# Patient Record
Sex: MALE | Race: WHITE | NOT HISPANIC OR LATINO | URBAN - METROPOLITAN AREA
[De-identification: names, ages, dates, MRNs, and addresses within clinical notes are randomized per-mention and may not be internally consistent; named-entity substitution may affect disease eponyms.]

---

## 2021-01-01 ENCOUNTER — OFFICE VISIT (OUTPATIENT)
Dept: URBAN - METROPOLITAN AREA CLINIC 68 | Facility: CLINIC | Age: 61
End: 2021-01-01

## 2021-02-02 ENCOUNTER — OFFICE VISIT (OUTPATIENT)
Dept: URBAN - METROPOLITAN AREA CLINIC 68 | Facility: CLINIC | Age: 61
End: 2021-02-02

## 2021-02-19 ENCOUNTER — OFFICE VISIT (OUTPATIENT)
Dept: URBAN - METROPOLITAN AREA SURGERY CENTER 12 | Facility: SURGERY CENTER | Age: 61
End: 2021-02-19

## 2021-02-22 ENCOUNTER — LAB OUTSIDE AN ENCOUNTER (OUTPATIENT)
Dept: URBAN - METROPOLITAN AREA CLINIC 68 | Facility: CLINIC | Age: 61
End: 2021-02-22

## 2021-02-22 LAB — 01: (no result)

## 2021-02-23 ENCOUNTER — TELEPHONE ENCOUNTER (OUTPATIENT)
Dept: URBAN - METROPOLITAN AREA CLINIC 68 | Facility: CLINIC | Age: 61
End: 2021-02-23

## 2021-03-30 ENCOUNTER — OFFICE VISIT (OUTPATIENT)
Dept: URBAN - METROPOLITAN AREA CLINIC 68 | Facility: CLINIC | Age: 61
End: 2021-03-30

## 2021-04-27 ENCOUNTER — OFFICE VISIT (OUTPATIENT)
Dept: URBAN - METROPOLITAN AREA CLINIC 68 | Facility: CLINIC | Age: 61
End: 2021-04-27

## 2021-10-27 ENCOUNTER — OFFICE VISIT (OUTPATIENT)
Dept: URBAN - METROPOLITAN AREA CLINIC 68 | Facility: CLINIC | Age: 61
End: 2021-10-27

## 2022-02-08 ENCOUNTER — OFFICE VISIT (OUTPATIENT)
Dept: URBAN - METROPOLITAN AREA CLINIC 68 | Facility: CLINIC | Age: 62
End: 2022-02-08

## 2022-03-18 ENCOUNTER — OFFICE VISIT (OUTPATIENT)
Dept: URBAN - METROPOLITAN AREA SURGERY CENTER 12 | Facility: SURGERY CENTER | Age: 62
End: 2022-03-18

## 2022-03-21 ENCOUNTER — LAB OUTSIDE AN ENCOUNTER (OUTPATIENT)
Dept: URBAN - METROPOLITAN AREA CLINIC 68 | Facility: CLINIC | Age: 62
End: 2022-03-21

## 2022-03-21 LAB — 01: (no result)

## 2022-03-24 ENCOUNTER — TELEPHONE ENCOUNTER (OUTPATIENT)
Dept: URBAN - METROPOLITAN AREA CLINIC 68 | Facility: CLINIC | Age: 62
End: 2022-03-24

## 2022-06-04 ENCOUNTER — TELEPHONE ENCOUNTER (OUTPATIENT)
Dept: URBAN - METROPOLITAN AREA CLINIC 68 | Facility: CLINIC | Age: 62
End: 2022-06-04

## 2022-06-04 RX ORDER — SODIUM SULFATE, POTASSIUM SULFATE, MAGNESIUM SULFATE 17.5; 3.13; 1.6 G/ML; G/ML; G/ML
SOLUTION, CONCENTRATE ORAL AS DIRECTED
Qty: 1 | Refills: 0 | OUTPATIENT
Start: 2021-02-02 | End: 2021-02-03

## 2022-06-05 ENCOUNTER — TELEPHONE ENCOUNTER (OUTPATIENT)
Dept: URBAN - METROPOLITAN AREA CLINIC 68 | Facility: CLINIC | Age: 62
End: 2022-06-05

## 2022-06-05 RX ORDER — MESALAMINE 1.2 G/1
TABLET, DELAYED RELEASE ORAL DAILY
Qty: 90 | Refills: 90 | Status: ACTIVE | COMMUNITY
Start: 2022-02-08

## 2022-06-25 ENCOUNTER — TELEPHONE ENCOUNTER (OUTPATIENT)
Age: 62
End: 2022-06-25

## 2022-06-25 RX ORDER — SODIUM SULFATE, POTASSIUM SULFATE, MAGNESIUM SULFATE 17.5; 3.13; 1.6 G/ML; G/ML; G/ML
SOLUTION, CONCENTRATE ORAL AS DIRECTED
Qty: 1 | Refills: 0 | OUTPATIENT
Start: 2021-02-02 | End: 2021-02-03

## 2022-06-26 ENCOUNTER — TELEPHONE ENCOUNTER (OUTPATIENT)
Age: 62
End: 2022-06-26

## 2022-06-26 RX ORDER — MESALAMINE 1.2 G/1
TABLET, DELAYED RELEASE ORAL DAILY
Qty: 90 | Refills: 90 | Status: ACTIVE | COMMUNITY
Start: 2022-02-08

## 2022-11-02 ENCOUNTER — TELEPHONE ENCOUNTER (OUTPATIENT)
Dept: URBAN - METROPOLITAN AREA CLINIC 68 | Facility: CLINIC | Age: 62
End: 2022-11-02

## 2022-11-02 RX ORDER — MESALAMINE 1.2 G/1
1 CAPSULE TABLET, DELAYED RELEASE ORAL ONCE A DAY
Qty: 90 CAPSULE | Refills: 3
Start: 2022-02-08 | End: 2023-10-29

## 2023-07-25 ENCOUNTER — TELEPHONE ENCOUNTER (OUTPATIENT)
Dept: URBAN - METROPOLITAN AREA CLINIC 7 | Facility: CLINIC | Age: 63
End: 2023-07-25

## 2023-07-25 RX ORDER — MESALAMINE 1.2 G/1
1 CAPSULE TABLET, DELAYED RELEASE ORAL ONCE A DAY
Qty: 90 | Refills: 0
Start: 2022-02-08 | End: 2023-10-25

## 2023-11-06 ENCOUNTER — OFFICE VISIT (OUTPATIENT)
Dept: URBAN - METROPOLITAN AREA CLINIC 68 | Facility: CLINIC | Age: 63
End: 2023-11-06
Payer: COMMERCIAL

## 2023-11-06 VITALS
RESPIRATION RATE: 67 BRPM | WEIGHT: 183 LBS | TEMPERATURE: 97.3 F | HEIGHT: 71 IN | BODY MASS INDEX: 25.62 KG/M2 | DIASTOLIC BLOOD PRESSURE: 80 MMHG | SYSTOLIC BLOOD PRESSURE: 122 MMHG | OXYGEN SATURATION: 99 % | HEART RATE: 17 BPM

## 2023-11-06 DIAGNOSIS — K51.00 CHRONIC ULCERATIVE COLITIS: ICD-10-CM

## 2023-11-06 DIAGNOSIS — K22.70 BARRETT'S ESOPHAGUS WITHOUT DYSPLASIA: ICD-10-CM

## 2023-11-06 PROCEDURE — 99214 OFFICE O/P EST MOD 30 MIN: CPT | Performed by: SPECIALIST

## 2023-11-06 RX ORDER — MESALAMINE 1.2 G/1
1 TABLET TABLET, DELAYED RELEASE ORAL ONCE A DAY
Qty: 90 TABLET | Refills: 3

## 2023-11-06 RX ORDER — MESALAMINE 1.2 G/1
2 TABLETS WITH A MEAL TABLET, DELAYED RELEASE ORAL ONCE A DAY
Status: ACTIVE | COMMUNITY

## 2023-11-06 RX ORDER — TADALAFIL 5 MG/1
1 TABLET AS NEEDED TABLET, FILM COATED ORAL ONCE A DAY
Status: ACTIVE | COMMUNITY

## 2023-11-06 NOTE — HPI-TODAY'S VISIT:
New dx Prostate ca, sp radiation 5 WEEKS OF RADIATION The patient is doing well with no upper GI alarm symptoms of dysphagia vomiting or pain he maintains a regular dose of Prilosec 20 mg a day for his known diagnosis of Winter's last endoscopy March 2021 revealed stable Winter's no dysplasiaThe patient had severe ulcerative colitis with severe symptoms and weight loss approximately 8 years ago.  He is now in remission on a single dose of mesalamine daily.  Last colonoscopy was 2021 Patient has no significant rectal bleeding diarrhea or colitis-like symptoms at this time  IMPRESSION Ulcerative colitis in remission on mesalamine maintain daily mesalamine.  Recent labs reportedly normal.  Patient with a history of ulcerative colitis for more than 8 years and will be due for a every 2- 3-year colonic surveillance in 2024No alarm symptoms but surveillance of Winter's will be due in 2024Otherwise stable condition SP new diagnosis Prostate CA PLANContinue mesalamine 1 daily Continue Prilosec 20mg a day Return for worsening or alarm symptomsRecommending surveillance  EGD and colon in March to April 2024

## 2024-03-18 ENCOUNTER — OV EP (OUTPATIENT)
Dept: URBAN - METROPOLITAN AREA CLINIC 68 | Facility: CLINIC | Age: 64
End: 2024-03-18

## 2024-03-18 RX ORDER — MESALAMINE 1.2 G/1
1 TABLET TABLET, DELAYED RELEASE ORAL ONCE A DAY
Qty: 90 TABLET | Refills: 3 | Status: ACTIVE | COMMUNITY

## 2024-03-18 RX ORDER — TADALAFIL 5 MG/1
1 TABLET AS NEEDED TABLET, FILM COATED ORAL ONCE A DAY
Status: ACTIVE | COMMUNITY

## 2024-03-27 ENCOUNTER — LAB (OUTPATIENT)
Dept: URBAN - METROPOLITAN AREA CLINIC 68 | Facility: CLINIC | Age: 64
End: 2024-03-27

## 2024-03-27 ENCOUNTER — OV EP (OUTPATIENT)
Dept: URBAN - METROPOLITAN AREA CLINIC 68 | Facility: CLINIC | Age: 64
End: 2024-03-27

## 2024-03-27 VITALS
HEIGHT: 71 IN | DIASTOLIC BLOOD PRESSURE: 91 MMHG | BODY MASS INDEX: 25.62 KG/M2 | HEART RATE: 75 BPM | WEIGHT: 183 LBS | OXYGEN SATURATION: 97 % | SYSTOLIC BLOOD PRESSURE: 141 MMHG

## 2024-03-27 RX ORDER — MESALAMINE 1.2 G/1
1 TABLET TABLET, DELAYED RELEASE ORAL ONCE A DAY
Qty: 90 TABLET | Refills: 3 | Status: ACTIVE | COMMUNITY

## 2024-03-27 RX ORDER — SOD SULF/POT CHLORIDE/MAG SULF 1.479 G
AS DIRECTED TABLET ORAL ONCE
Qty: 24 TABLET | Refills: 0 | OUTPATIENT
Start: 2024-03-27 | End: 2024-03-28

## 2024-03-27 RX ORDER — MESALAMINE 1.2 G/1
1 TABLET TABLET, DELAYED RELEASE ORAL ONCE A DAY
Qty: 90 TABLET | Refills: 3

## 2024-03-27 RX ORDER — TADALAFIL 5 MG/1
1 TABLET AS NEEDED TABLET, FILM COATED ORAL ONCE A DAY
Status: ACTIVE | COMMUNITY

## 2024-03-27 NOTE — HPI-TODAY'S VISIT:
3/27/24  Finished radiation, slight bleeding all stopped  PSA coming down  No colitis syjptoms or UGI alarma symptoms    New dx Prostate ca, sp radiation 5 WEEKS OF RADIATION The patient is doing well with no upper GI alarm symptoms of dysphagia vomiting or pain he maintains a regular dose of Prilosec 20 mg a day for his known diagnosis of Winter's last endoscopy March 2021 revealed stable Winter's no dysplasiaThe patient had severe ulcerative colitis with severe symptoms and weight loss approximately 8 years ago.  He is now in remission on a single dose of mesalamine daily.  Last colonoscopy was 2021 Patient has no significant rectal bleeding diarrhea or colitis-like symptoms at this time  IMPRESSION Ulcerative colitis in remission on mesalamine maintain daily mesalamine.  Recent labs reportedly normal.  Patient with a history of ulcerative colitis for more than 8 years and will be due for a every 2- 3-year colonic surveillance in 2024No alarm symptoms but surveillance of Winter's will be due in 2024Otherwise stable condition SP new diagnosis Prostate CA PLANContinue mesalamine 1 daily Continue Prilosec 20mg a day Return for worsening or alarm symptomsRecommending surveillance  EGD and colon in March to April 2024

## 2024-03-27 NOTE — PHYSICAL EXAM CHEST:
no lesions, no deformities, no traumatic injuries, no significant scars are present, chest wall non-tender, no masses present, breathing is unlabored without accessory muscle use,normal breath sounds , no lesions, no deformities, no traumatic injuries, no significant scars are present, chest wall non-tender, no masses present, breathing is unlabored without accessory muscle use,normal breath sounds
1

## 2024-04-26 ENCOUNTER — COL/EGD (OUTPATIENT)
Dept: URBAN - METROPOLITAN AREA SURGERY CENTER 12 | Facility: SURGERY CENTER | Age: 64
End: 2024-04-26

## 2024-05-28 ENCOUNTER — OFFICE VISIT (OUTPATIENT)
Dept: URBAN - METROPOLITAN AREA SURGERY CENTER 12 | Facility: SURGERY CENTER | Age: 64
End: 2024-05-28

## 2024-06-18 ENCOUNTER — CLAIMS CREATED FROM THE CLAIM WINDOW (OUTPATIENT)
Dept: URBAN - METROPOLITAN AREA SURGERY CENTER 12 | Facility: SURGERY CENTER | Age: 64
End: 2024-06-18
Payer: COMMERCIAL

## 2024-06-18 ENCOUNTER — CLAIMS CREATED FROM THE CLAIM WINDOW (OUTPATIENT)
Dept: URBAN - METROPOLITAN AREA CLINIC 4 | Facility: CLINIC | Age: 64
End: 2024-06-18
Payer: COMMERCIAL

## 2024-06-18 DIAGNOSIS — K51.50 LEFT SIDED ULCERATIVE COLITIS WITHOUT COMPLICATION: ICD-10-CM

## 2024-06-18 DIAGNOSIS — K31.89 OTHER DISEASES OF STOMACH AND DUODENUM: ICD-10-CM

## 2024-06-18 DIAGNOSIS — K22.89 OTHER SPECIFIED DISEASE OF ESOPHAGUS: ICD-10-CM

## 2024-06-18 DIAGNOSIS — K31.7 POLYP OF STOMACH AND DUODENUM: ICD-10-CM

## 2024-06-18 DIAGNOSIS — K62.89 OTHER SPECIFIED DISEASES OF ANUS AND RECTUM: ICD-10-CM

## 2024-06-18 DIAGNOSIS — K29.70 GASTRITIS, UNSPECIFIED, WITHOUT BLEEDING: ICD-10-CM

## 2024-06-18 DIAGNOSIS — K22.70 BARRETT'S ESOPHAGUS WITHOUT DYSPLASIA: ICD-10-CM

## 2024-06-18 PROCEDURE — 45380 COLONOSCOPY AND BIOPSY: CPT | Performed by: SPECIALIST

## 2024-06-18 PROCEDURE — 88305 TISSUE EXAM BY PATHOLOGIST: CPT | Performed by: PATHOLOGY

## 2024-06-18 PROCEDURE — 88312 SPECIAL STAINS GROUP 1: CPT | Performed by: PATHOLOGY

## 2024-06-18 PROCEDURE — 43239 EGD BIOPSY SINGLE/MULTIPLE: CPT | Performed by: SPECIALIST

## 2024-06-18 PROCEDURE — 88313 SPECIAL STAINS GROUP 2: CPT | Performed by: PATHOLOGY

## 2024-06-18 RX ORDER — MESALAMINE 1.2 G/1
1 TABLET TABLET, DELAYED RELEASE ORAL ONCE A DAY
Qty: 90 TABLET | Refills: 3 | Status: ACTIVE | COMMUNITY

## 2024-06-18 RX ORDER — TADALAFIL 5 MG/1
1 TABLET AS NEEDED TABLET, FILM COATED ORAL ONCE A DAY
Status: ACTIVE | COMMUNITY

## 2025-04-17 ENCOUNTER — OFFICE VISIT (OUTPATIENT)
Dept: URBAN - METROPOLITAN AREA CLINIC 68 | Facility: CLINIC | Age: 65
End: 2025-04-17

## 2025-04-17 ENCOUNTER — DASHBOARD ENCOUNTERS (OUTPATIENT)
Age: 65
End: 2025-04-17

## 2025-04-17 RX ORDER — MESALAMINE 1.2 G/1
1 TABLET TABLET, DELAYED RELEASE ORAL ONCE A DAY
Qty: 90 TABLET | Refills: 3
Start: 2025-04-17

## 2025-04-17 RX ORDER — MESALAMINE 1.2 G/1
1 TABLET TABLET, DELAYED RELEASE ORAL ONCE A DAY
Qty: 90 TABLET | Refills: 3 | Status: ACTIVE | COMMUNITY

## 2025-04-17 RX ORDER — TADALAFIL 5 MG/1
1 TABLET AS NEEDED TABLET, COATED ORAL ONCE A DAY
Status: ACTIVE | COMMUNITY

## 2025-04-17 NOTE — HPI-TODAY'S VISIT:
4/16 no active symptoms  last egd [facility]   2024 colon no acitvity    [facility] Remission  cont mesalamine indefiniteley  continue prilosice  surveilence 2026     3/27/24  Finished radiation, slight bleeding all stopped  PSA coming down  No colitis symptoms or UGI alarm symptoms    New DX Prostate ca, SP radiation 5 WEEKS OF RADIATION The patient is doing well with no upper GI alarm symptoms of dysphagia vomiting or pain he maintains a regular dose of Prilosec 20 mg a day for his known diagnosis of Winter's last endoscopy March 2021 revealed stable Winter's no dysplasia The patient had severe ulcerative colitis with severe symptoms and weight loss approximately 8 years ago.  He is now in remission on a single dose of mesalamine daily.  Last colonoscopy was 2021 Patient has no significant rectal bleeding diarrhea or colitis-like symptoms at this time  IMPRESSION Ulcerative colitis in remission on mesalamine maintain daily mesalamine.  Recent labs reportedly normal.  Patient with a history of ulcerative colitis for more than 8 years and will be due for a every 2- 3-year colonic surveillance in 2024No alarm symptoms but surveillance of Winter's will be due in 2024Otherwise stable condition SP new diagnosis Prostate CA PLAN Continue mesalamine 1 daily Continue Prilosec 20mg a day Return for worsening or alarm symptoms Recommending surveillance  EGD and colon in March to April 2024

## 2025-06-23 ENCOUNTER — P2P PATIENT RECORD (OUTPATIENT)
Age: 65
End: 2025-06-23